# Patient Record
Sex: MALE | Race: WHITE | NOT HISPANIC OR LATINO | URBAN - METROPOLITAN AREA
[De-identification: names, ages, dates, MRNs, and addresses within clinical notes are randomized per-mention and may not be internally consistent; named-entity substitution may affect disease eponyms.]

---

## 2018-06-22 ENCOUNTER — OFFICE VISIT (OUTPATIENT)
Dept: URBAN - METROPOLITAN AREA CLINIC 45 | Facility: CLINIC | Age: 33
End: 2018-06-22
Payer: COMMERCIAL

## 2018-06-22 DIAGNOSIS — T15.01XA FOREIGN BODY IN CORNEA, RIGHT EYE, INITIAL ENCOUNTER: Primary | ICD-10-CM

## 2018-06-22 PROCEDURE — 92002 INTRM OPH EXAM NEW PATIENT: CPT | Performed by: OPTOMETRIST

## 2018-06-22 PROCEDURE — 65222 REMOVE FOREIGN BODY FROM EYE: CPT | Performed by: OPTOMETRIST

## 2018-06-22 RX ORDER — NEOMYCIN SULFATE, POLYMYXIN B SULFATE AND DEXAMETHASONE 3.5; 10000; 1 MG/ML; [USP'U]/ML; MG/ML
SUSPENSION OPHTHALMIC
Qty: 1 | Refills: 0 | Status: INACTIVE
Start: 2018-06-22 | End: 2018-07-21

## 2018-06-22 NOTE — IMPRESSION/PLAN
Impression: Foreign body in cornea, right eye, initial encounter: T15.01xA. Plan: removed with spud and forceps.  Maxitrol qid OD

## 2018-07-02 ENCOUNTER — OFFICE VISIT (OUTPATIENT)
Dept: URBAN - METROPOLITAN AREA CLINIC 45 | Facility: CLINIC | Age: 33
End: 2018-07-02
Payer: COMMERCIAL

## 2018-07-02 DIAGNOSIS — H17.89 OTHER CORNEAL SCAR: Primary | ICD-10-CM

## 2018-07-02 DIAGNOSIS — T15.01XS FOREIGN BODY IN CORNEA, RIGHT EYE, SEQUELA: ICD-10-CM

## 2018-07-02 PROCEDURE — 99213 OFFICE O/P EST LOW 20 MIN: CPT | Performed by: OPTOMETRIST

## 2018-07-02 ASSESSMENT — INTRAOCULAR PRESSURE
OS: 19
OD: 20

## 2018-07-02 NOTE — IMPRESSION/PLAN
Impression: Foreign body in cornea, right eye, sequela: T15.01xS.  Plan: removed residual rust with spud, artificial tears prn, Maxitrol qid OD x 1 week then dc, safety glasses